# Patient Record
Sex: FEMALE | Race: WHITE | NOT HISPANIC OR LATINO
[De-identification: names, ages, dates, MRNs, and addresses within clinical notes are randomized per-mention and may not be internally consistent; named-entity substitution may affect disease eponyms.]

---

## 2017-08-15 ENCOUNTER — RESULT REVIEW (OUTPATIENT)
Age: 62
End: 2017-08-15

## 2018-10-16 ENCOUNTER — RESULT REVIEW (OUTPATIENT)
Age: 63
End: 2018-10-16

## 2019-02-19 ENCOUNTER — APPOINTMENT (OUTPATIENT)
Dept: OBGYN | Facility: CLINIC | Age: 64
End: 2019-02-19

## 2021-10-06 ENCOUNTER — APPOINTMENT (OUTPATIENT)
Dept: ORTHOPEDIC SURGERY | Facility: CLINIC | Age: 66
End: 2021-10-06
Payer: COMMERCIAL

## 2021-10-06 ENCOUNTER — NON-APPOINTMENT (OUTPATIENT)
Age: 66
End: 2021-10-06

## 2021-10-06 DIAGNOSIS — Q66.71 CONGEN PES CAVUS, RT FOOT: ICD-10-CM

## 2021-10-06 DIAGNOSIS — S86.301A UNSPECIFIED INJURY OF MUSCLE(S) AND TENDON(S) OF PERONEAL MUSCLE GROUP AT LOWER LEG LEVEL, RIGHT LEG, INITIAL ENCOUNTER: ICD-10-CM

## 2021-10-06 DIAGNOSIS — M76.71 PERONEAL TENDINITIS, RIGHT LEG: ICD-10-CM

## 2021-10-06 PROCEDURE — 99204 OFFICE O/P NEW MOD 45 MIN: CPT

## 2021-10-06 RX ORDER — MELOXICAM 15 MG/1
15 TABLET ORAL DAILY
Qty: 30 | Refills: 1 | Status: ACTIVE | COMMUNITY
Start: 2021-10-06 | End: 1900-01-01

## 2021-10-06 NOTE — HISTORY OF PRESENT ILLNESS
[FreeTextEntry1] : 10/6/2021: OMARI RHODES is a 66 year old female presenting for an initial evaluation of right ankle pain. The patient’s pain is noted to be a 6/10. The patient states that she rolled her ankle approximately 6 weeks ago while on a boardwalk. After the injury, she went to an Urgent Care facility where she was obtained XR imaging and was diagnosed with no acute fractures. She is concerned with instability and swelling to the ankle. She localizes her pain to the posterior tibial tendon and peroneal tendons. The patient presents [facility] in [facility]. She is currently utilizing Tumeric and Diclofenac Sodium tablets for inflammation purposes.  OMARI denies any numbness or tingling sensations. No other complaints.

## 2021-10-06 NOTE — ADDENDUM
[FreeTextEntry1] : I, Aydee Ferreira, acted solely as a scribe for Dr. Balwinder Perez on this date 10/06/2021.\par \par All medical record entries made by the Scribe were at my, Dr. Balwinder Perez, direction and personally dictated by me on 10/06/2021 . I have reviewed the chart and agree that the record accurately reflects my personal performance of the history, physical exam, assessment and plan. I have also personally directed, reviewed, and agreed with the chart.	\par

## 2021-10-06 NOTE — PHYSICAL EXAM
[de-identified] : General: Alert and oriented x3. In no acute distress. Pleasant in nature with a normal affect. No apparent respiratory distress.\par \par Right Foot and Ankle Exam\par Skin: Clean, dry, intact\par Inspection: No obvious malalignment, no masses, + swelling, no effusion. Pes cavus. \par Pulses: 2+ DP/PT pulses\par ROM: FOOT Full  ROM of digits, ANKLE 10 degrees of dorsiflexion, 40 degrees of plantarflexion, 10 degrees of subtalar motion.\par Painful ROM: None\par Tenderness: + tenderness to the peroneals. + tenderness to the distal fibula. No tenderness over the medial malleolus, No tenderness over the lateral malleolus, no CFL/ATFL/PTFL pain, no deltoid ligament pain. No heel pain. No Achilles tenderness. No 5th metatarsal pain. No pain to the LisFranc joint. No ttp over the posterior tibial tendon.\par Stability: Negative anterior/posterior drawer.\par Strength: 5/5 ADD/ABD/TA/GS/EHL/FHL/EDL\par Neuro: Sensation in tact to light touch throughout\par Additional tests: Negative Mortons test, negative tarsal tunnel tinels, negative single heel rise.			 [de-identified] : MRI of the right ankle obtained on 9/30/2021, results reviewed with the patient today 10/6/2021, results as reported in the chart: \par \par IMPRESSION:\par \par 1. Split type tear of the peroneus brevis tendon with severe peroneal\par tenosynovitis.\par 2. Mild posterior tibial tenosynovitis.\par 3. Evidence of old lateral ankle sprain.

## 2021-10-06 NOTE — DISCUSSION/SUMMARY
[de-identified] : Today I had a lengthy discussion with the patient regarding their right ankle pain. I have addressed all the patient's concerns surrounding the pathology of their condition. MRI results were reviewed with the patient today in the clinic. At this time, I am recommending that the patient undergo conservative management for symptomatic relief. I recommend the patient undergo a course of physical therapy for the right ankle  2-3 times a week for a total of 6-8 weeks. A prescription was given for the physical therapy today.\par \par I recommended that the patient utilize an ASO brace. The patient was fitted for the ASO brace in the office today.	\par \par For inflammation, I advised that the patient utilize Voltaren gel topically. If the Voltaren gel could not be obtained, Icy Hot, Biofreeze, or Bengay can be utilized instead. I also recommended that the patient utilize ice, NSAIDs, and heat PRN. They can also elevate their right ankle above the level of the heart.		\par 	\par The patient understood and verbally agreed to the treatment plan. All of their questions were answered and they were satisfied with the visit. The patient should call the office if they have any questions or experience worsening symptoms. I would like to see the patient back in the office in 6-8 weeks to reassess their condition. 				\par

## 2021-11-17 ENCOUNTER — APPOINTMENT (OUTPATIENT)
Dept: ORTHOPEDIC SURGERY | Facility: CLINIC | Age: 66
End: 2021-11-17